# Patient Record
Sex: FEMALE | Race: WHITE
[De-identification: names, ages, dates, MRNs, and addresses within clinical notes are randomized per-mention and may not be internally consistent; named-entity substitution may affect disease eponyms.]

---

## 2017-05-12 ENCOUNTER — HOSPITAL ENCOUNTER (OUTPATIENT)
Dept: HOSPITAL 75 - PREOP | Age: 77
End: 2017-05-12
Attending: SURGERY
Payer: MEDICARE

## 2017-05-12 VITALS — HEIGHT: 66.5 IN | BODY MASS INDEX: 36.21 KG/M2 | WEIGHT: 228 LBS

## 2017-05-12 DIAGNOSIS — Z01.818: Primary | ICD-10-CM

## 2017-05-12 DIAGNOSIS — K58.9: ICD-10-CM

## 2017-05-16 ENCOUNTER — HOSPITAL ENCOUNTER (OUTPATIENT)
Dept: HOSPITAL 75 - ENDO | Age: 77
Discharge: HOME | End: 2017-05-16
Attending: SURGERY
Payer: MEDICARE

## 2017-05-16 VITALS — SYSTOLIC BLOOD PRESSURE: 128 MMHG | DIASTOLIC BLOOD PRESSURE: 73 MMHG

## 2017-05-16 VITALS — SYSTOLIC BLOOD PRESSURE: 173 MMHG | DIASTOLIC BLOOD PRESSURE: 82 MMHG

## 2017-05-16 VITALS — DIASTOLIC BLOOD PRESSURE: 79 MMHG | SYSTOLIC BLOOD PRESSURE: 154 MMHG

## 2017-05-16 VITALS — WEIGHT: 228 LBS | HEIGHT: 66.5 IN | BODY MASS INDEX: 36.21 KG/M2

## 2017-05-16 VITALS — SYSTOLIC BLOOD PRESSURE: 154 MMHG | DIASTOLIC BLOOD PRESSURE: 79 MMHG

## 2017-05-16 DIAGNOSIS — K52.9: ICD-10-CM

## 2017-05-16 DIAGNOSIS — Z12.11: Primary | ICD-10-CM

## 2017-05-16 NOTE — DISCHARGE INST-SIMPLE/STANDARD
Discharge Inst-Standard


Patient Instructions/Follow Up


Plan of Care/Instructions/FU:  


2 weeks Cherry


Activity as Tolerated:  Yes


Discharge Diet:  Regular Diet











ARAVIND HENNESSY DO May 16, 2017 8:28 am

## 2017-05-16 NOTE — PROGRESS NOTE-POST OPERATIVE
Post-Operative Progess Note


Surgeon (s)/Assistant (s)


Surgeon


ARAVIND HENNESSY DO


Assistant:  na





Pre-Operative Diagnosis


screening history of IBS





Post-Operative Diagnosis





minimal sigmoid colitis





Procedure & Operative Findings


Date of Procedure


5/16/17


Procedure Preformed/Findings


minimal sigmoid colitis


Anesthesia Type


per crna





Estimated Blood Loss


Estimated blood loss (mL):  none





Specimens/Packing


Specimens Removed


sigmoid colon


Packing:  


ARAVIND Villa DO May 16, 2017 8:27 am

## 2017-05-17 NOTE — OPERATIVE REPORT
DATE OF SERVICE:  05/16/2017



PREOPERATIVE DIAGNOSIS:

Screening colonoscopy, history of irritable bowel syndrome.



POSTOPERATIVE DIAGNOSIS:

Minimal sigmoid colitis.



PROCEDURE:

Colonoscopy with cold biopsy of sigmoid colon.



SURGEON:

Aravind Carey DO



ANESTHESIA:

Per CRNA.



ESTIMATED BLOOD LOSS:

None.



COMPLICATIONS:

None.



INDICATIONS:

The patient is a 76-year-old female due for colonoscopy.  She understands the

risks and benefits of procedure and wished to proceed with procedure.  Consent

was signed in the chart.



PROCEDURE IN DETAIL:

The patient was taken to the endoscopy suite, placed in left lateral recumbent

position.  Timeout was performed.  Digital rectal exam was performed.  There

were no palpable polyps, masses or ulcerations.  Scope was inserted in the

rectum and advanced all the way to the cecum with minimal difficulty.  Prep was

adequate.  Scope was then slowly retracted back.  There are no polyps, masses or

ulcerations within the cecum, ascending, transverse, descending colon.  Within

the sigmoid colon, there was a small amount of erythematous changes consistent

with a low-grade sigmoid colitis and possibly even from the prep.  Biopsy of

this area was obtained.  The scope was then continued to be slowly retracted

back into the rectum.  The scope was also retroflexed noting no other pathology.

 Scope was returned to its normal position slowly withdrawn until completely

removed.



RECOMMENDATIONS:

The patient will follow up in the office in two weeks to go over pathology.  If

she has any problems prior to that, she should be reevaluated at that time.  She

will need repeat colonoscopy in 10 years unless family history of colon cancer

which will be within 5 years or personal history of polyps which will be within

5 years.





Job ID: [id number]

DocumentID: [id number]

Dictated Date:  05/16/2017 08:29:21

Transcription Date: 05/16/2017 17:46:02

Dictated By: ARAVIND CAREY DO

## 2017-09-26 ENCOUNTER — HOSPITAL ENCOUNTER (OUTPATIENT)
Dept: HOSPITAL 75 - CARD | Age: 77
End: 2017-09-26
Attending: NURSE PRACTITIONER
Payer: MEDICARE

## 2017-09-26 DIAGNOSIS — R00.2: Primary | ICD-10-CM

## 2017-09-26 PROCEDURE — 93306 TTE W/DOPPLER COMPLETE: CPT

## 2017-10-17 ENCOUNTER — HOSPITAL ENCOUNTER (OUTPATIENT)
Dept: HOSPITAL 75 - RAD | Age: 77
End: 2017-10-17
Attending: FAMILY MEDICINE
Payer: MEDICARE

## 2017-10-17 DIAGNOSIS — Z12.31: Primary | ICD-10-CM

## 2017-10-17 PROCEDURE — 77067 SCR MAMMO BI INCL CAD: CPT

## 2017-10-17 NOTE — DIAGNOSTIC IMAGING REPORT
Bilateral screening mammogram 2D views with tomosynthesis.



The current study was also evaluated with a Computer Aided

Detection (CAD) system.



INDICATION: Screening. No current complaints stated on the

questionnaire.



COMPARISON: 10/07/2016.



FINDINGS: The breasts are composed of scattered fibroglandular

densities. Occasional benign-appearing calcifications are seen.

Allowing for technique and positional differences, no suspicious

change is seen.



IMPRESSION: No significant change.



ACR BI-RADS Category 2: Benign findings.

Result letter will be mailed to the patient.

Note: At least 10% of breast cancer is not imaged by mammography.

 



Dictated by: 



  Dictated on workstation # [id number]

## 2018-03-28 ENCOUNTER — HOSPITAL ENCOUNTER (OUTPATIENT)
Dept: HOSPITAL 75 - RAD | Age: 78
End: 2018-03-28
Attending: NURSE PRACTITIONER
Payer: MEDICARE

## 2018-03-28 DIAGNOSIS — M41.86: ICD-10-CM

## 2018-03-28 DIAGNOSIS — M47.816: Primary | ICD-10-CM

## 2018-03-28 DIAGNOSIS — Z98.0: ICD-10-CM

## 2018-03-28 PROCEDURE — 72100 X-RAY EXAM L-S SPINE 2/3 VWS: CPT

## 2018-03-28 NOTE — DIAGNOSTIC IMAGING REPORT
INDICATION: Left leg numbness and tingling.



TIME OF EXAM: 1:37 p.m.



Right convexity lumbar scoliotic curvature is noted. There are

postoperative changes of posterior instrument fusion with

vertical stabilization rods and bipedicular screws extending from

L2 through L4. The hardware appears intact without fracture or

loosening. There is minimal anterolisthesis of L4 on L5. There

appears to be significant degenerative disc disease at the L1-L2

level as well as moderate disc space narrowing at L4-L5 and L5-S1

levels compatible with degenerative disc disease. Decompression

laminectomy at L2 through L4 is seen as well.



IMPRESSION: Lumbar spondylosis and scoliosis with postsurgical

changes of posterior instrumented fusion L2 through L5. No

complicating feature is identified.



Dictated by: 



  Dictated on workstation # [id number]

## 2018-11-27 ENCOUNTER — HOSPITAL ENCOUNTER (OUTPATIENT)
Dept: HOSPITAL 75 - RAD | Age: 78
End: 2018-11-27
Attending: NURSE PRACTITIONER
Payer: MEDICARE

## 2018-11-27 DIAGNOSIS — Z12.31: Primary | ICD-10-CM

## 2018-11-27 DIAGNOSIS — R92.8: ICD-10-CM

## 2018-11-27 PROCEDURE — 77067 SCR MAMMO BI INCL CAD: CPT

## 2018-11-27 NOTE — DIAGNOSTIC IMAGING REPORT
Indication: Routine screening.



Comparison is made prior mammogram from 10/17/2017 and

10/07/2016.



2-D and 3-D bilateral screening mammography was performed with

CAD.



Scattered fibroglandular densities are identified bilaterally.

Benign calcifications are identified bilaterally. There is focal

density in the retroareolar and slightly superior aspect of the

left breast on MLO view. No corresponding density on the CC view

is seen. This may represent summation but additional views are

recommended. No other suspicious abnormality is seen. No

malignant-appearing microcalcifications are identified. Axillae

are unremarkable.



Impression: BI-RADS 0



Left breast density. Additional views are recommended.



ACR BI-RADS Category 0: Incomplete. (Needs additional imaging

evaluation).

Result letter will be mailed to the patient.

Note: At least 10% of breast cancer is not imaged by mammography.



Dictated by: 



  Dictated on workstation # [id number]

## 2018-12-05 ENCOUNTER — HOSPITAL ENCOUNTER (OUTPATIENT)
Dept: HOSPITAL 75 - RAD | Age: 78
End: 2018-12-05
Attending: NURSE PRACTITIONER
Payer: MEDICARE

## 2018-12-05 DIAGNOSIS — R92.2: Primary | ICD-10-CM

## 2018-12-05 NOTE — DIAGNOSTIC IMAGING REPORT
Indication: Left breast density. Patient presents for additional

views.



Correlation is made with recent screening study from 11/27/2018.



Unilateral left 2-D and 3-D diagnostic mammography was performed

including spot compression ML view and conventional 90 degree

lateral view.



Additional views failed to demonstrate a discrete mass. Area of

density in the retroareolar upper left breast resolves and most

likely represents superimposed tissue. No mass or suspicious

calcifications are seen.



Impression: BI-RADS category one



Additional views fail to demonstrate a discrete mass. Patient may

return to routine annual screening mammography.



ACR BI-RADS Category 1: Negative.

Result letter will be mailed to the patient.

Note:  At least 10% of breast cancer is not imaged by

mammography.



Dictated by: 



  Dictated on workstation # [id number]

## 2020-01-22 ENCOUNTER — HOSPITAL ENCOUNTER (OUTPATIENT)
Dept: HOSPITAL 75 - RAD | Age: 80
End: 2020-01-22
Attending: NURSE PRACTITIONER
Payer: MEDICARE

## 2020-01-22 DIAGNOSIS — Z12.31: Primary | ICD-10-CM

## 2020-01-22 PROCEDURE — 77067 SCR MAMMO BI INCL CAD: CPT

## 2020-01-22 NOTE — DIAGNOSTIC IMAGING REPORT
INDICATION: 

Routine screening.



COMPARISON:     

11/27/2018 and 10/17/2017.



TECHNIQUE: 

2D and 3D bilateral screening mammography was performed with CAD.



FINDINGS:

Scattered fibroglandular densities are identified bilaterally.

There are scattered benign calcifications. No mass or malignant

appearing microcalcifications are seen. The axillae are

unremarkable.



IMPRESSION: 

No mammographic features suspicious for malignancy are

identified.



ACR BI-RADS Category 2: Benign findings.

Result letter will be mailed to the patient.

Note: At least 10% of breast cancer is not imaged by mammography.



Dictated by: 



  Dictated on workstation # [id number]

## 2021-04-06 ENCOUNTER — HOSPITAL ENCOUNTER (OUTPATIENT)
Dept: HOSPITAL 75 - RAD | Age: 81
End: 2021-04-06
Attending: FAMILY MEDICINE
Payer: MEDICARE

## 2021-04-06 DIAGNOSIS — Z12.31: Primary | ICD-10-CM

## 2021-04-06 PROCEDURE — 77063 BREAST TOMOSYNTHESIS BI: CPT

## 2021-04-06 PROCEDURE — 77067 SCR MAMMO BI INCL CAD: CPT

## 2021-04-06 NOTE — DIAGNOSTIC IMAGING REPORT
INDICATION: Routine screening



Comparison is made prior mammogram 01/22/2020 and 11/27/2018.



2-D and 3-D bilateral screening mammography was performed with

CAD.



Scattered fibroglandular densities are identified bilaterally.

Occasional benign calcifications are noted bilaterally. No mass

or malignant appearing microcalcifications are seen. Axillae are

unremarkable.



IMPRESSION: BI-RADS Category 2



No mammographic features suspicious for malignancy are

identified.



ACR BI-RADS Category 2: Benign findings.

Result letter will be mailed to the patient.

Note: At least 10% of breast cancer is not imaged by mammography.



Dictated by: 



  Dictated on workstation # [id number]

## 2022-05-04 ENCOUNTER — HOSPITAL ENCOUNTER (OUTPATIENT)
Dept: HOSPITAL 75 - RAD | Age: 82
End: 2022-05-04
Attending: FAMILY MEDICINE
Payer: MEDICARE

## 2022-05-04 DIAGNOSIS — Z12.31: Primary | ICD-10-CM

## 2022-05-04 PROCEDURE — 77063 BREAST TOMOSYNTHESIS BI: CPT

## 2022-05-04 PROCEDURE — 77067 SCR MAMMO BI INCL CAD: CPT

## 2022-05-04 NOTE — DIAGNOSTIC IMAGING REPORT
INDICATION: 

Routine screening.



COMPARISON:     

04/06/2021 and 01/22/2020.



TECHNIQUE: 

2D and 3D bilateral screening mammography was performed with CAD.



FINDINGS:

Scattered fibroglandular densities are identified bilaterally.

The parenchymal pattern is stable. No mass or malignant-appearing

microcalcifications are seen. There are benign calcifications

present. The axillae are unremarkable.



IMPRESSION: 

No mammographic features suspicious for malignancy are

identified.



ACR BI-RADS Category 2: Benign findings.

Result letter will be mailed to the patient.

Note: At least 10% of breast cancer is not imaged by mammography.



Dictated by: 



  Dictated on workstation # [id number]

## 2022-06-29 ENCOUNTER — HOSPITAL ENCOUNTER (OUTPATIENT)
Dept: HOSPITAL 75 - RAD | Age: 82
End: 2022-06-29
Attending: INTERNAL MEDICINE
Payer: MEDICARE

## 2022-06-29 DIAGNOSIS — N18.31: Primary | ICD-10-CM

## 2022-06-29 DIAGNOSIS — D63.1: ICD-10-CM

## 2022-06-29 DIAGNOSIS — Z79.899: ICD-10-CM

## 2022-06-29 DIAGNOSIS — E55.9: ICD-10-CM

## 2022-06-29 DIAGNOSIS — Z79.1: ICD-10-CM

## 2022-06-29 DIAGNOSIS — D50.9: ICD-10-CM

## 2022-06-29 DIAGNOSIS — M53.80: ICD-10-CM

## 2022-06-29 PROCEDURE — 76770 US EXAM ABDO BACK WALL COMP: CPT

## 2022-06-29 NOTE — DIAGNOSTIC IMAGING REPORT
PROCEDURE: US Renal Bilateral.



TECHNIQUE: Multiple real-time grayscale images were obtained over

the kidneys in various projections bilaterally.



INDICATION: Chronic kidney disease.



Right kidney measures 9.2 x 3.8 x 5.0 cm and the left kidney

measures 8.4 x 4.6 x 4.0 cm. Cortical thickness and echogenicity

is normal. No calculi are seen. There is some mild prominence of

the right renal collecting system and right renal pelvis

suggestive of mild hydronephrosis. Left kidney is unremarkable.

Prevoid bladder volume is 118 mL. Post void bladder volume is 4

mL. Bilateral ureteral jets were visualized.



IMPRESSION: Questionable mild right-sided hydronephrosis. Study

is otherwise unremarkable.



Dictated by: 



  Dictated on workstation # [id number]

## 2023-06-27 ENCOUNTER — HOSPITAL ENCOUNTER (OUTPATIENT)
Dept: HOSPITAL 75 - RAD | Age: 83
End: 2023-06-27
Attending: FAMILY MEDICINE
Payer: MEDICARE

## 2023-06-27 DIAGNOSIS — Z12.31: Primary | ICD-10-CM

## 2023-06-27 PROCEDURE — 77063 BREAST TOMOSYNTHESIS BI: CPT

## 2023-06-27 PROCEDURE — 77067 SCR MAMMO BI INCL CAD: CPT

## 2023-06-27 NOTE — DIAGNOSTIC IMAGING REPORT
INDICATION: 

Routine screening.



COMPARISON: 

05/04/2022 and 04/06/2021.



TECHNIQUE: 

2D and 3D bilateral screening mammography was performed with CAD.



FINDINGS:

Scattered fibroglandular densities are identified bilaterally.

The parenchymal pattern is stable. There are scattered benign

calcifications. No mass or malignant-appearing

microcalcifications are seen. The axillae are unremarkable.



IMPRESSION: 

No mammographic features suspicious for malignancy are

identified.



ACR BI-RADS Category 2: Benign findings.

Result letter will be mailed to the patient.

Note: At least 10% of breast cancer is not imaged by mammography.



Dictated by: 



  Dictated on workstation # [id number]